# Patient Record
Sex: FEMALE | Race: BLACK OR AFRICAN AMERICAN
[De-identification: names, ages, dates, MRNs, and addresses within clinical notes are randomized per-mention and may not be internally consistent; named-entity substitution may affect disease eponyms.]

---

## 2018-05-28 ENCOUNTER — HOSPITAL ENCOUNTER (EMERGENCY)
Dept: HOSPITAL 17 - NEPE | Age: 48
Discharge: HOME | End: 2018-05-28
Payer: SELF-PAY

## 2018-05-28 VITALS
OXYGEN SATURATION: 99 % | RESPIRATION RATE: 16 BRPM | SYSTOLIC BLOOD PRESSURE: 219 MMHG | HEART RATE: 71 BPM | TEMPERATURE: 98.3 F | DIASTOLIC BLOOD PRESSURE: 102 MMHG

## 2018-05-28 VITALS — BODY MASS INDEX: 35.43 KG/M2 | WEIGHT: 220.46 LBS | HEIGHT: 66 IN

## 2018-05-28 DIAGNOSIS — I10: ICD-10-CM

## 2018-05-28 DIAGNOSIS — M79.604: Primary | ICD-10-CM

## 2018-05-28 DIAGNOSIS — F17.290: ICD-10-CM

## 2018-05-28 DIAGNOSIS — J45.909: ICD-10-CM

## 2018-05-28 LAB
ALBUMIN SERPL-MCNC: 3.8 GM/DL (ref 3.4–5)
ALP SERPL-CCNC: 66 U/L (ref 45–117)
ALT SERPL-CCNC: 20 U/L (ref 10–53)
AST SERPL-CCNC: 32 U/L (ref 15–37)
BASOPHILS # BLD AUTO: 0.1 TH/MM3 (ref 0–0.2)
BASOPHILS NFR BLD: 1 % (ref 0–2)
BILIRUB SERPL-MCNC: 0.4 MG/DL (ref 0.2–1)
BUN SERPL-MCNC: 15 MG/DL (ref 7–18)
CALCIUM SERPL-MCNC: 8.2 MG/DL (ref 8.5–10.1)
CHLORIDE SERPL-SCNC: 105 MEQ/L (ref 98–107)
CREAT SERPL-MCNC: 0.99 MG/DL (ref 0.5–1)
EOSINOPHIL # BLD: 0.1 TH/MM3 (ref 0–0.4)
EOSINOPHIL NFR BLD: 1.3 % (ref 0–4)
ERYTHROCYTE [DISTWIDTH] IN BLOOD BY AUTOMATED COUNT: 13.8 % (ref 11.6–17.2)
GFR SERPLBLD BASED ON 1.73 SQ M-ARVRAT: 73 ML/MIN (ref 89–?)
GLUCOSE SERPL-MCNC: 84 MG/DL (ref 74–106)
HCO3 BLD-SCNC: 26 MEQ/L (ref 21–32)
HCT VFR BLD CALC: 36.2 % (ref 35–46)
HGB BLD-MCNC: 12.4 GM/DL (ref 11.6–15.3)
INR PPP: 1 RATIO
LYMPHOCYTES # BLD AUTO: 2.7 TH/MM3 (ref 1–4.8)
LYMPHOCYTES NFR BLD AUTO: 41.1 % (ref 9–44)
MCH RBC QN AUTO: 29.2 PG (ref 27–34)
MCHC RBC AUTO-ENTMCNC: 34.3 % (ref 32–36)
MCV RBC AUTO: 85.3 FL (ref 80–100)
MONOCYTE #: 0.8 TH/MM3 (ref 0–0.9)
MONOCYTES NFR BLD: 11.7 % (ref 0–8)
NEUTROPHILS # BLD AUTO: 2.9 TH/MM3 (ref 1.8–7.7)
NEUTROPHILS NFR BLD AUTO: 44.9 % (ref 16–70)
PLATELET # BLD: 270 TH/MM3 (ref 150–450)
PMV BLD AUTO: 7.9 FL (ref 7–11)
PROT SERPL-MCNC: 7.9 GM/DL (ref 6.4–8.2)
PROTHROMBIN TIME: 9.7 SEC (ref 9.8–11.6)
RBC # BLD AUTO: 4.24 MIL/MM3 (ref 4–5.3)
SODIUM SERPL-SCNC: 139 MEQ/L (ref 136–145)
WBC # BLD AUTO: 6.5 TH/MM3 (ref 4–11)

## 2018-05-28 PROCEDURE — 80053 COMPREHEN METABOLIC PANEL: CPT

## 2018-05-28 PROCEDURE — 85610 PROTHROMBIN TIME: CPT

## 2018-05-28 PROCEDURE — 85025 COMPLETE CBC W/AUTO DIFF WBC: CPT

## 2018-05-28 PROCEDURE — 85730 THROMBOPLASTIN TIME PARTIAL: CPT

## 2018-05-28 PROCEDURE — 93971 EXTREMITY STUDY: CPT

## 2018-05-28 PROCEDURE — 99284 EMERGENCY DEPT VISIT MOD MDM: CPT

## 2018-05-28 NOTE — PD
HPI


Chief Complaint:  Edema


Time Seen by Provider:  07:53


Travel History


International Travel<30 days:  No


Contact w/Intl Traveler<30days:  No


Traveled to known affect area:  No





History of Present Illness


HPI


Patient is a 47-year-old female who comes in complaining of right leg pain and 

swelling.  She says she came in because she wants to make sure she does not 

have a blood clot.  She says the pain has been coming and going for about a 

week.  She does report she has been walking more lately.  She denies chest pain 

or shortness of breath.  She has tried taking Tylenol with minimal relief of 

her symptoms.  She denies any recent travel.  She is not on any birth control 

or hormone replacement.  Severity is mild.





PFSH


Past Medical History


Arthritis:  No


Asthma:  Yes


Heart Rhythm Problems:  No


High Cholesterol:  No (denies)


Chest Pain:  No


Congestive Heart Failure:  No


COPD:  No


Diminished Hearing:  No


Gastrointestinal Disorders:  No


Genitourinary:  No


Hypertension:  Yes


Musculoskeletal:  No


Neurologic:  No


Reproductive:  No


Respiratory:  No


Immunizations Current:  No


Sleep Apnea:  No


Pregnant?:  Not Pregnant


:  2


Para:  2


Miscarriage:  0


:  0





Past Surgical History


Surgical History:  No Previous Surgery


Abdominal Surgery:  No


Cardiac Surgery:  No


 Section:  Yes


Ear Surgery:  No


Endocrine Surgery:  No


Eye Surgery:  No


Genitourinary Surgery:  No


Gynecologic Surgery:  Yes


Neurologic Surgery:  No


Oral Surgery:  No


Thoracic Surgery:  No


Other Surgery:  Yes





Social History


Alcohol Use:  Yes (occasionally)


Tobacco Use:  Yes (black and milds)


Substance Use:  No





Allergies-Medications


(Allergen,Severity, Reaction):  


Coded Allergies:  


     hydromorphone (Unverified  Allergy, Severe, Nausea/Vomiting, 18)


Reported Meds & Prescriptions





Reported Meds & Active Scripts


Active


Reported


Claritin (Loratadine) 10 Mg Cap 10 Mg PO DAILY


Omeprazole 20 Mg Tab 20 Mg PO DAILY


Amlodipine (Amlodipine Besylate) 5 Mg Tab Unknown Dose PO DAILY


Clonidine (Clonidine HCl) 0.1 Mg Tab 0.1 Mg PO BID








Review of Systems


Except as stated in HPI:  all other systems reviewed are Neg


General / Constitutional:  No: Fever, Chills


HENT:  No: Headaches, Lightheadedness


Cardiovascular:  No: Chest Pain or Discomfort


Respiratory:  No: Shortness of Breath


Gastrointestinal:  No: Nausea, Vomiting


Musculoskeletal:  Positive: Edema, Pain


Skin:  No Rash, No Change in Pigmentation


Neurologic:  No: Weakness, Dizziness





Physical Exam


Narrative


GENERAL: Awake and alert, no acute distress.


SKIN: Focused skin assessment warm/dry.  No wounds or signs of infection.


HEAD: Atraumatic. Normocephalic. 


EYES: Pupils equal and round. No scleral icterus. 


ENT: No nasal bleeding or discharge.  Mucous membranes pink and moist.


NECK: Trachea midline. No JVD. 


CARDIOVASCULAR: Regular rate and rhythm.  No murmur appreciated.


RESPIRATORY: No accessory muscle use. Clear to auscultation. Breath sounds 

equal bilaterally. 


GASTROINTESTINAL: Abdomen soft, non-tender, nondistended. 


MUSCULOSKELETAL: No obvious deformities. No clubbing.  No cyanosis.  No edema.  

Tender to palpation of the right calf.  Pedal pulses intact.


NEUROLOGICAL: Awake and alert. No obvious cranial nerve deficits.  Motor 

grossly within normal limits. Normal speech.


PSYCHIATRIC: Appropriate mood and affect; insight and judgment normal.





Data


Data


Last Documented VS





Vital Signs








  Date Time  Temp Pulse Resp B/P (MAP) Pulse Ox O2 Delivery O2 Flow Rate FiO2


 


18 07:30 98.3 71 16 219/102 (141) 99   








Orders





 Orders


Iv Access Insert/Monitor (18 08:05)


Complete Blood Count With Diff (18 08:05)


Comprehensive Metabolic Panel (18 08:05)


Act Partial Throm Time (Ptt) (18 08:05)


Prothrombin Time / Inr (Pt) (18 08:05)


Us Leg Venous Doppler (18 )





Labs





Laboratory Tests








Test


  18


08:20


 


White Blood Count 6.5 TH/MM3 


 


Red Blood Count 4.24 MIL/MM3 


 


Hemoglobin 12.4 GM/DL 


 


Hematocrit 36.2 % 


 


Mean Corpuscular Volume 85.3 FL 


 


Mean Corpuscular Hemoglobin 29.2 PG 


 


Mean Corpuscular Hemoglobin


Concent 34.3 % 


 


 


Red Cell Distribution Width 13.8 % 


 


Platelet Count 270 TH/MM3 


 


Mean Platelet Volume 7.9 FL 


 


Neutrophils (%) (Auto) 44.9 % 


 


Lymphocytes (%) (Auto) 41.1 % 


 


Monocytes (%) (Auto) 11.7 % 


 


Eosinophils (%) (Auto) 1.3 % 


 


Basophils (%) (Auto) 1.0 % 


 


Neutrophils # (Auto) 2.9 TH/MM3 


 


Lymphocytes # (Auto) 2.7 TH/MM3 


 


Monocytes # (Auto) 0.8 TH/MM3 


 


Eosinophils # (Auto) 0.1 TH/MM3 


 


Basophils # (Auto) 0.1 TH/MM3 


 


CBC Comment DIFF FINAL 


 


Differential Comment  


 


Prothrombin Time 9.7 SEC 


 


Prothromb Time International


Ratio 1.0 RATIO 


 


 


Activated Partial


Thromboplast Time 21.4 SEC 


 


 


Blood Urea Nitrogen 15 MG/DL 


 


Creatinine 0.99 MG/DL 


 


Random Glucose 84 MG/DL 


 


Total Protein 7.9 GM/DL 


 


Albumin 3.8 GM/DL 


 


Calcium Level 8.2 MG/DL 


 


Alkaline Phosphatase 66 U/L 


 


Aspartate Amino Transf


(AST/SGOT) 32 U/L 


 


 


Alanine Aminotransferase


(ALT/SGPT) 20 U/L 


 


 


Total Bilirubin 0.4 MG/DL 


 


Sodium Level 139 MEQ/L 


 


Potassium Level 4.1 MEQ/L 


 


Chloride Level 105 MEQ/L 


 


Carbon Dioxide Level 26.0 MEQ/L 


 


Anion Gap 8 MEQ/L 


 


Estimat Glomerular Filtration


Rate 73 ML/MIN 


 











MDM


Medical Decision Making


Medical Screen Exam Complete:  Yes


Emergency Medical Condition:  Yes


Medical Record Reviewed:  Yes


Differential Diagnosis


DVT versus muscle strain versus dehydration


Narrative Course


Patient is a 47-year-old female who comes in complaining of right leg pain.  

Exam shows tenderness to the right calf.  IV established, labs sent.  Labs show 

no acute abnormalities.  Ultrasound of the leg performed shows no evidence of 

DVT.


Last 24 hours Impressions








Lower Extremity Ultrasound 18 0000 Signed





Impressions: 





 CONCLUSION: 





 1.  The study is negative for lower extremity deep venous thrombosis. 





  





 





Patient informed of the results.  From this is likely a muscle strain from her 

increased walking.  She is advised to take Tylenol as needed for pain.  Advised 

follow-up with her doctor.  Advised return to the ED as needed for any 

worsening symptoms.  She is requesting prescriptions for her blood pressure 

medications clonidine and amlodipine.  This was provided for her.





Diagnosis





 Primary Impression:  


 Leg pain, right


Referrals:  


[facility]


call for appointment


Patient Instructions:  General Instructions, Leg Pain (ED)





***Additional Instructions:  


Follow-up with a primary care doctor.  Take Tylenol as needed for pain.  Return 

to the ED as needed for any worsening symptoms.


Scripts


Clonidine (Clonidine) 0.1 Mg Tab


0.1 MG PO BID for Blood Pressure Management, #60 TAB 0 Refills


   Prov: Susan Rocha MD         18 


Amlodipine (Amlodipine) 5 Mg Tab


5 MG PO DAILY for Blood Pressure Management, #30 TAB 0 Refills


   Prov: Susan Rocha MD         18


Disposition:  01 DISCHARGE HOME


Condition:  Stable











Susan Rocha MD May 28, 2018 10:50

## 2018-05-28 NOTE — RADRPT
EXAM DATE:  2018 9:52 AM EDT

AGE/SEX:        47 years / Female



INDICATIONS:  Right leg pain.



CLINICAL DATA:  This is the patient's initial encounter. Patient reports that signs and symptoms have
 been present for 1 week and indicates a pain score of 8/10. 

                                                                          

MEDICAL/SURGICAL HISTORY:       . Hypertension.   section.



COMPARISON:      No prior [place] exams available for comparison.

TECHNIQUE:  Venous ultrasound of both lower extremities was performed from the inguinal ligament to t
he proximal calf.  Real-time, color Doppler and spectral tracing, compression and augmentation techni
ques were used.  



FINDINGS:  

There is normal compressibility of the deep venous system from the inguinal region to the proximal ca
lf.  No echogenic clot is seen in the lumen of the common femoral, femoral, popliteal, and posterior 
tibial veins.  There is a normal response of the venous system to proximal and distal augmentation an
d respiration.   



CONCLUSION: 

1.  The study is negative for lower extremity deep venous thrombosis. 



Electronically signed by: Alejo Good MD  2018 9:58 AM EDT